# Patient Record
Sex: FEMALE | Race: WHITE
[De-identification: names, ages, dates, MRNs, and addresses within clinical notes are randomized per-mention and may not be internally consistent; named-entity substitution may affect disease eponyms.]

---

## 2021-01-18 ENCOUNTER — HOSPITAL ENCOUNTER (OUTPATIENT)
Dept: HOSPITAL 47 - RADECHMAIN | Age: 61
Discharge: HOME | End: 2021-01-18
Attending: INTERNAL MEDICINE
Payer: COMMERCIAL

## 2021-01-18 DIAGNOSIS — I08.1: Primary | ICD-10-CM

## 2021-01-18 DIAGNOSIS — I09.89: ICD-10-CM

## 2021-01-18 PROCEDURE — 93306 TTE W/DOPPLER COMPLETE: CPT

## 2021-01-18 NOTE — ECHOF
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



MEASUREMENTS

--------

HEIGHT: 182.9 cm

WEIGHT: 99.8 kg

BP: 

IVSd:   1.1 cm     (0.6 - 1.1)

LVIDd:   4.4 cm     (3.9 - 5.3)

LVPWd:   1.4 cm     (0.6 - 1.1)

IVSs:   1.5 cm

LVIDs:   3.4 cm

LVPWs:   1.4 cm

LAESV Index (A-L):   21.22 ml/m

Ao Diam:   3.2 cm     (2.0 - 3.7)

AV Cusp:   2.2 cm     (1.5 - 2.6)

LA Diam:   3.5 cm     (2.7 - 3.8)

MV EXCURSION:   20.347 mm     (> 18.000)

MV EF SLOPE:   76 mm/s     (70 - 150)

EPSS:   0.9 cm

MV E Rolando:   0.46 m/s

MV DecT:   324 ms

MV A Rolando:   0.84 m/s

MV E/A Ratio:   0.54 

RAP:   5.00 mmHg

RVSP:   13.29 mmHg







FINDINGS

--------

Sinus rhythm.

This was a technically good study.

LV size, wall thickness and systolic function are normal, with an EF greater than 55%.   The left maureen
tricular size is normal.   The diastolic filling pattern is normal for the age of the patient 6.28.

The right ventricle is normal in size.

Normal LA  size by volume 22+/-6 ml/m2.

The right atrial size is normal.

The aortic valve is trileaflet, and appears structurally normal. No aortic stenosis or regurgitation.


The mitral valve is normal.   Mild mitral regurgitation is present.

The tricuspid valve appears structurally normal.   Mild tricuspid regurgitation present.   Right vent
ricular systolic pressure is normal at < 35 mmHg.

Trace/mild (physiologic)  pulmonic regurgitation.

The aortic root size is normal.

There is no pericardial effusion.



CONCLUSIONS

--------

1. LV size, wall thickness and systolic function are normal, with an EF greater than 55%.

2. Normal LA size by volume 22+/-6 ml/m2.

3. The aortic valve is trileaflet, and appears structurally normal. No aortic stenosis or regurgitati
on.

4. Mild mitral regurgitation is present.

5. Mild tricuspid regurgitation present.

6. Trace/mild (physiologic)  pulmonic regurgitation.

7. There is no pericardial effusion.





SONOGRAPHER: Maranda Benoit RDCS